# Patient Record
Sex: FEMALE
[De-identification: names, ages, dates, MRNs, and addresses within clinical notes are randomized per-mention and may not be internally consistent; named-entity substitution may affect disease eponyms.]

---
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
## 2021-01-04 NOTE — HPI: PIMPLES (ACNE)
Physical Therapy Daily Treatment Note  Date:  2021    Patient Name:  Ceasar Palmer    :  1979  MRN: [id number]    Restrictions/Precautions:  Restrictions/Precautions  Restrictions/Precautions: Fall Risk(Low)  Pertinent Medical History:      Medical/Treatment Diagnosis Information:  · Diagnosis: Chronic Neck Pain M54.2, G89.29  · Treatment Diagnosis: Neck Pain, Muscle Imbalance    Insurance/Certification information:  PT Insurance Information: [employer], allowed 60 PT visits per contract year, $40 copay  Physician Information:  Referring Practitioner: ILAN Pacheco  Plan of care signed (Y/N): Faxed 20    Visit# / total visits:    Pain level: 4-5/10     Functional Outcomes Measure:  Test: Neck Disability Index  Score:8 = 16% Disability (20)     Progress Note: [x]  Yes  []  No  Next due by: Visit #10      History of Injury:Patient reports she has had neck pain for several years; 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 Patient had been having regular massages, hasn't had one since Feb due to Covid 19. Patient has difficulty sleeping due to pain and anxiety, has stiffness in am, as the day goes on pain increases. Patient is working from home, 9 hours per day, 5 days per week, tries to take a break every 1-2 hours. Patient reports pain begins to increase after 3-4 hours working, climbs through the day, and is pretty uncomfortable by the end of the day. Patient does stretches, walking 30 minutes on treadmill. Patient does get some relief, but not complete. \"When things get really tight I get numbness and weakness in my L hand, especially the little finger. \"  Patient reports she applies heat, then it does relax some. Subjective:     20: Did pretty well following eval. Not too bad today.  Some tightness in posterior neck and L shoulder  20:  Patient reports neck is doing a little better, still is sore at the end of the day but the HEP is
What Type Of Note Output Would You Prefer (Optional)?: Bullet Format
How Severe Is Your Acne?: mild
helping  12/11/20: Sore in neck and L shoulder. Has been anxious today. Overall, is feeling better since starting therapy  12/18/20:   Did great last session. A little tight today  12/22: Just feels tight or stiff  12/29/20:  Patient reports she does ok in am upon waking, but notices pain on L side of neck along lev scap, lat to prox humeral region. This occurs both on work days and non-work days. 1/4/21: Patient reports doing better overall, has had some tingling and achiness near Deltoid tuberosity with a lump noted. Objective:   Observation: Mod tightness noted R upper trap, lev scap, SCM, scalenes; min tightness noted L upper trap, lev scap, SCM, scalenes.  Test measurements:      Exercises:  Exercise/Equipment Resistance/Repetitions Other comments        UE Whitlash flexion 10x R/L    Tslide:  Row               Ext               Lat pull Yellow 10x  Yellow 10x  Yellow 10x                                                              Other Therapeutic Activities:  12/1/20:  Discussed at length anatomy and biomechanics of the neck, muscle reeducation, motor recruitment. Home Exercise Program:  12/1/20:   Patient instructed in chin tucks, lower trap sets, forward bending, backward bending, rotation R and L, sidebending R and L; written instructions with pictures issued, patient able to demonstrate exercises. 12/7/20:   Patient instructed in wall slide with step, prone neck retraction; written instructions with pictures issued, patient able to demonstrate exercises. 12/11: levator and doorway stretch  12/29/20:   Patient instructed in theraband lat pull down, row, ext; standing lower trap raise; supine chin nod ; written instructions with pictures issued, patient able to demonstrate exercises. 1/4/21:   Patient instructed in wall push up ; written instructions with pictures issued, patient able to demonstrate exercises.     Manual Treatments: Soft tissue mobilization to B cervical regions, with focus on
Is This A New Presentation, Or A Follow-Up?: Acne
upper traps, lev scap, SCM, scalenes; suboccipital release x 30 minutes total    Modalities:     Progression Towards Functional goals:  [x] Patient is progressing as expected towards functional goals listed. [] Progression is slowed due to complexities listed. [] Progression has been slowed due to co-morbidities. [] Plan just implemented, too soon to assess goals progression  [] Other:    Charges: Therapeutic Exercise:  [x] (01813) Provided verbal/tactile cueing for activities to restore or maintain strength, flexibility, endurance, ROM for improvements with self-care, mobility, lifting and ambulation. Neuromuscular Re-Education  [] (99214) Provided verbal/tactile cueing for activities to restore or maintain balance, coordination, kinesthetic sense, posture, motor skill, proprioception for self-care, mobility, lifting, and ambulation. Therapeutic Activities:    [] (71071) Provided verbal/tactile cueing to address functional limitations related to loss of mobility, strength, balance, and coordination.      Gait Training:  [] (35068) Provided training and instruction to the patient for proper postural muscle recruitment and positioning with ambulation re-education     Home Exercise Program:    [x] (51115) Reviewed/Progressed HEP activities related to strengthening, flexibility, endurance, ROM for functional self-care, mobility, lifting and ambulation   [] (94175) Reviewed/Progressed HEP activities related to improving balance, coordination, kinesthetic sense, posture, motor skill, proprioception for self-care, mobility, lifting, and ambulation      Manual Treatments:  MFR / STM / Oscillations-Mobs:  G-I, II, III, IV / Manipulation / MLD  [x] (64208) Provided manual therapy to mobilize  soft tissue/joints/fluid for the purpose of modulating pain, promoting relaxation, increasing ROM, reducing/eliminating soft tissue swelling/inflammation/restriction, improving soft tissue extensibility and allowing for
Additional Comments (Use Complete Sentences): She is doing pureology for about a year. Has a lot of scarring on the chest and back from acne was she was younger. Was on Tri-sprintac but is now on the ring now as birth control. She has been getting cystic acne more recently.
proper ROM for normal function with self- care, mobility, lifting and ambulation. Timed Code Treatment Minutes: 45   Total Treatment Minutes: 45     [] EVAL (LOW) 93091   [] EVAL (MOD) 58951   [] EVAL (HIGH) 71926   [] RE-EVAL   [x] TE (62065) x     [] Aquatic (33531) x  [] NMR (56685)   x  [] Aquatic Group (67430) x  [x] Manual (93001) x 2   [] Ultrasound (65363) x  [] TA (59446) x  [] Mech Traction (48912)  [] Ionto (90658)           [] ES (un) (50053):   [] Vasopump (15304) [] Other:      Assessment  [x] Patient tolerated treatment well [] Patient limited by fatigue  [] Patient limited by pain  [] Patient limited by other medical complications  [] Other:     Prognosis: [x] Good [] Fair  [] Poor    Goals:    Short term goals  Time Frame for Short term goals: 4-6 weeks  Short term goal 1: Pain </= 0/10 at rest, 2/10 with activity  Short term goal 2: Patient able to demonstrate neck AROM WNL without symptoms  Short term goal 3: Patient able to sleep thru night without waking due to neck pain  Short term goal 4: Patient able to work entire day without increased symptoms  Short term goal 5: Patient independent with written home exercise program            Patient Requires Follow-up: [x] Yes  [] No    Plan:   [x] Continue per plan of care [] Alter current plan (see comments)  [] Plan of care initiated [] Hold pending MD visit [] Discharge  Note: If patient does not return for scheduled/ recommended follow up visits, this note will serve as a discharge from care along with most recent update on progress. Plan for Next Session:  Monitor response. Instruct patient in standing lower trap raise for HEP if able.     Electronically signed by:  Sofia Martinez, [address] [facility]

## 2021-02-22 ENCOUNTER — APPOINTMENT (RX ONLY)
Dept: URBAN - METROPOLITAN AREA CLINIC 174 | Facility: CLINIC | Age: 21
Setting detail: DERMATOLOGY
End: 2021-02-22

## 2021-02-22 DIAGNOSIS — L70.0 ACNE VULGARIS: ICD-10-CM | Status: INADEQUATELY CONTROLLED

## 2021-02-22 PROCEDURE — 99214 OFFICE O/P EST MOD 30 MIN: CPT

## 2021-02-22 PROCEDURE — ? PRESCRIPTION

## 2021-02-22 PROCEDURE — ? COUNSELING

## 2021-02-22 PROCEDURE — ? TREATMENT REGIMEN

## 2021-02-22 RX ORDER — SULFACETAMIDE SODIUM AND SULFUR 100; 50 MG/G; MG/G
1 APPLICATION CREAM TOPICAL BID
Qty: 1 | Refills: 1 | Status: ERX | COMMUNITY
Start: 2021-02-22

## 2021-02-22 RX ADMIN — SULFACETAMIDE SODIUM AND SULFUR 1 APPLICATION: 100; 50 CREAM TOPICAL at 00:00

## 2021-02-22 ASSESSMENT — LOCATION SIMPLE DESCRIPTION DERM: LOCATION SIMPLE: CHEST

## 2021-02-22 ASSESSMENT — LOCATION ZONE DERM: LOCATION ZONE: TRUNK

## 2021-02-22 ASSESSMENT — LOCATION DETAILED DESCRIPTION DERM: LOCATION DETAILED: RIGHT MEDIAL SUPERIOR CHEST

## 2021-02-22 NOTE — PROCEDURE: TREATMENT REGIMEN
Initiate Treatment: Sod/sulfur wash BID
Continue Regimen: Clinda lotion BID, Retin-A 0.025% qHS (denies need for refills today)
Plan: She is flaring on her chest, so I am adding a new wash and will see her back in 2 months to reassess.
Detail Level: Zone

## 2021-02-25 ENCOUNTER — RX ONLY (OUTPATIENT)
Age: 21
Setting detail: RX ONLY
End: 2021-02-25

## 2021-02-25 RX ORDER — SULFACETAMIDE SODIUM, SULFUR 98; 48 MG/G; MG/G
1 APPLICATION LIQUID TOPICAL BID
Qty: 1 | Refills: 2 | Status: ERX | COMMUNITY
Start: 2021-02-25

## 2021-02-26 ENCOUNTER — RX ONLY (OUTPATIENT)
Age: 21
Setting detail: RX ONLY
End: 2021-02-26

## 2021-02-26 RX ORDER — SULFACETAMIDE SODIUM AND SULFUR 100; 50 MG/G; MG/G
CREAM TOPICAL BID
Qty: 1 | Refills: 1 | Status: CANCELLED
Stop reason: CLARIF

## 2021-02-26 RX ORDER — DAPSONE 75 MG/G
1 APPLICATION GEL TOPICAL QD
Qty: 1 | Refills: 1 | Status: ERX | COMMUNITY
Start: 2021-02-26

## 2021-04-22 ENCOUNTER — APPOINTMENT (RX ONLY)
Dept: URBAN - METROPOLITAN AREA CLINIC 174 | Facility: CLINIC | Age: 21
Setting detail: DERMATOLOGY
End: 2021-04-22

## 2021-04-22 DIAGNOSIS — L70.0 ACNE VULGARIS: ICD-10-CM | Status: WELL CONTROLLED

## 2021-04-22 PROCEDURE — 99213 OFFICE O/P EST LOW 20 MIN: CPT

## 2021-04-22 PROCEDURE — ? TREATMENT REGIMEN

## 2021-04-22 PROCEDURE — ? COUNSELING

## 2021-04-22 ASSESSMENT — LOCATION ZONE DERM: LOCATION ZONE: TRUNK

## 2021-04-22 ASSESSMENT — LOCATION DETAILED DESCRIPTION DERM: LOCATION DETAILED: RIGHT MEDIAL SUPERIOR CHEST

## 2021-04-22 ASSESSMENT — SEVERITY ASSESSMENT OVERALL AMONG ALL PATIENTS
IN YOUR EXPERIENCE, AMONG ALL PATIENTS YOU HAVE SEEN WITH THIS CONDITION, HOW SEVERE IS THIS PATIENT'S CONDITION?: ALMOST CLEAR

## 2021-04-22 ASSESSMENT — LOCATION SIMPLE DESCRIPTION DERM: LOCATION SIMPLE: CHEST

## 2021-04-22 NOTE — PROCEDURE: TREATMENT REGIMEN
Continue Regimen: Dapsone Gel qAM, Retin-A 0.025% qHS (denies need for refills today)
Plan: She is clear today and very happy with the current regimen, so she will continue and I will see her back in 3 months to reassess.
Detail Level: Zone

## 2021-10-07 ENCOUNTER — APPOINTMENT (RX ONLY)
Dept: URBAN - METROPOLITAN AREA CLINIC 174 | Facility: CLINIC | Age: 21
Setting detail: DERMATOLOGY
End: 2021-10-07

## 2021-10-07 DIAGNOSIS — L70.0 ACNE VULGARIS: ICD-10-CM | Status: STABLE

## 2021-10-07 PROCEDURE — ? COUNSELING

## 2021-10-07 PROCEDURE — ? TREATMENT REGIMEN

## 2021-10-07 PROCEDURE — 99213 OFFICE O/P EST LOW 20 MIN: CPT

## 2021-10-07 ASSESSMENT — LOCATION ZONE DERM: LOCATION ZONE: TRUNK

## 2021-10-07 ASSESSMENT — LOCATION SIMPLE DESCRIPTION DERM: LOCATION SIMPLE: RIGHT UPPER BACK

## 2021-10-07 ASSESSMENT — LOCATION DETAILED DESCRIPTION DERM: LOCATION DETAILED: RIGHT SUPERIOR MEDIAL UPPER BACK

## 2021-10-07 NOTE — PROCEDURE: TREATMENT REGIMEN
Discontinue Regimen: Dapsone gel and Retin-A
Plan: The pt was given samples of the above to try and see if she likes them.  We discussed trying to find a Sulfa wash that her insurance will cover, but she wants to hold off for now and will call if she decides she wants a rx sent in.  F/u 6 months or sooner PRN.
Detail Level: Zone
Samples Given: [place] Posay Adapalene 1% gel and 5.5% BPO with exfoliator wash
Consent (Ear)/Introductory Paragraph: The rationale for Mohs was explained to the patient and consent was obtained. The risks, benefits and alternatives to therapy were discussed in detail. Specifically, the risks of ear deformity, infection, scarring, bleeding, prolonged wound healing, incomplete removal, allergy to anesthesia, nerve injury and recurrence were addressed. Prior to the procedure, the treatment site was clearly identified and confirmed by the patient. All components of Universal Protocol/PAUSE Rule completed.

## 2023-01-20 ENCOUNTER — OFFICE (OUTPATIENT)
Dept: URBAN - METROPOLITAN AREA CLINIC 18 | Facility: CLINIC | Age: 23
End: 2023-01-20
Payer: MEDICAID

## 2023-01-20 VITALS
DIASTOLIC BLOOD PRESSURE: 84 MMHG | WEIGHT: 131 LBS | SYSTOLIC BLOOD PRESSURE: 126 MMHG | HEART RATE: 71 BPM | HEIGHT: 65 IN

## 2023-01-20 DIAGNOSIS — K58.9 IRRITABLE BOWEL SYNDROME WITHOUT DIARRHEA: ICD-10-CM

## 2023-01-20 DIAGNOSIS — K59.09 OTHER CONSTIPATION: ICD-10-CM

## 2023-01-20 DIAGNOSIS — K64.9 UNSPECIFIED HEMORRHOIDS: ICD-10-CM

## 2023-01-20 PROCEDURE — 99243 OFF/OP CNSLTJ NEW/EST LOW 30: CPT | Mod: SA | Performed by: NURSE PRACTITIONER

## 2023-01-23 LAB
C-REACTIVE PROTEIN: <0.3 MG/DL
CBC  AND  PLATELET COUNT: HEMATOCRIT: 41.3 %
CBC  AND  PLATELET COUNT: HEMOGLOBIN: 14 G/DL
CBC  AND  PLATELET COUNT: MCH: 30.2 PG
CBC  AND  PLATELET COUNT: MCHC: 33.9 G/DL
CBC  AND  PLATELET COUNT: MCV: 89.2 FL
CBC  AND  PLATELET COUNT: MPV: 9.7 FL
CBC  AND  PLATELET COUNT: PLATELET COUNT: 242 K/UL
CBC  AND  PLATELET COUNT: RBC: 4.63 M/UL
CBC  AND  PLATELET COUNT: RDW: 11.6 %
CBC  AND  PLATELET COUNT: WBC COUNT: 5.7 K/UL
COMPREHENSIVE METABOLIC PANEL: A/G RATIO: 1.8 RATIO
COMPREHENSIVE METABOLIC PANEL: ALBUMIN: 4.3 G/DL
COMPREHENSIVE METABOLIC PANEL: ALK PHOSPHATASE: 49 U/L
COMPREHENSIVE METABOLIC PANEL: ALT: 17 U/L
COMPREHENSIVE METABOLIC PANEL: AST: 15 U/L
COMPREHENSIVE METABOLIC PANEL: BILIRUBIN,TOTAL: 0.2 MG/DL
COMPREHENSIVE METABOLIC PANEL: BLOOD UREA NITROGEN: 8 MG/DL
COMPREHENSIVE METABOLIC PANEL: BUN/CREAT RATIO: 10
COMPREHENSIVE METABOLIC PANEL: CALCIUM: 9.4 MG/DL
COMPREHENSIVE METABOLIC PANEL: CHLORIDE: 104 MEQ/L
COMPREHENSIVE METABOLIC PANEL: CO2: 27 MEQ/L
COMPREHENSIVE METABOLIC PANEL: CREATININE: 0.8 MG/DL
COMPREHENSIVE METABOLIC PANEL: FASTING STATUS: (no result)
COMPREHENSIVE METABOLIC PANEL: GLOBULIN: 2.4 G/DL
COMPREHENSIVE METABOLIC PANEL: GLOMERULAR FILTRATION RATE (GFR): 107 MLS/MIN/1.73M2
COMPREHENSIVE METABOLIC PANEL: GLUCOSE,RANDOM: 93 MG/DL
COMPREHENSIVE METABOLIC PANEL: POTASSIUM: 4.4 MEQ/L
COMPREHENSIVE METABOLIC PANEL: SODIUM: 139 MEQ/L
COMPREHENSIVE METABOLIC PANEL: TOTAL PROTEIN: 6.7 G/DL
TISSUE TRANSGLUTAMINASE AB, IGA: <4